# Patient Record
Sex: MALE | Race: WHITE | NOT HISPANIC OR LATINO | Employment: FULL TIME
[De-identification: names, ages, dates, MRNs, and addresses within clinical notes are randomized per-mention and may not be internally consistent; named-entity substitution may affect disease eponyms.]

---

## 2024-11-11 ENCOUNTER — OFFICE VISIT (OUTPATIENT)
Dept: INTERNAL MEDICINE | Facility: OTHER | Age: 44
End: 2024-11-11
Payer: COMMERCIAL

## 2024-11-11 VITALS
TEMPERATURE: 98.7 F | HEIGHT: 67 IN | BODY MASS INDEX: 29.51 KG/M2 | OXYGEN SATURATION: 95 % | WEIGHT: 188 LBS | DIASTOLIC BLOOD PRESSURE: 84 MMHG | HEART RATE: 106 BPM | SYSTOLIC BLOOD PRESSURE: 133 MMHG

## 2024-11-11 DIAGNOSIS — Z00.00 HEALTHCARE MAINTENANCE: ICD-10-CM

## 2024-11-11 DIAGNOSIS — R13.19 ESOPHAGEAL DYSPHAGIA: ICD-10-CM

## 2024-11-11 DIAGNOSIS — R53.83 OTHER FATIGUE: ICD-10-CM

## 2024-11-11 DIAGNOSIS — F41.1 GENERALIZED ANXIETY DISORDER: ICD-10-CM

## 2024-11-11 DIAGNOSIS — Z76.89 ENCOUNTER TO ESTABLISH CARE: ICD-10-CM

## 2024-11-11 DIAGNOSIS — Z23 NEED FOR VACCINATION: ICD-10-CM

## 2024-11-11 DIAGNOSIS — K21.9 GASTROESOPHAGEAL REFLUX DISEASE WITHOUT ESOPHAGITIS: ICD-10-CM

## 2024-11-11 DIAGNOSIS — F55.0: ICD-10-CM

## 2024-11-11 DIAGNOSIS — N52.8 OTHER MALE ERECTILE DYSFUNCTION: ICD-10-CM

## 2024-11-11 DIAGNOSIS — R68.82 LIBIDO, DECREASED: ICD-10-CM

## 2024-11-11 PROCEDURE — 3079F DIAST BP 80-89 MM HG: CPT | Mod: GC

## 2024-11-11 PROCEDURE — 90656 IIV3 VACC NO PRSV 0.5 ML IM: CPT

## 2024-11-11 PROCEDURE — 90471 IMMUNIZATION ADMIN: CPT

## 2024-11-11 PROCEDURE — 99204 OFFICE O/P NEW MOD 45 MIN: CPT | Mod: GC,25

## 2024-11-11 PROCEDURE — 3075F SYST BP GE 130 - 139MM HG: CPT | Mod: GC

## 2024-11-11 RX ORDER — OMEPRAZOLE 40 MG/1
40 CAPSULE, DELAYED RELEASE ORAL DAILY
Qty: 45 CAPSULE | Refills: 0 | Status: SHIPPED | OUTPATIENT
Start: 2024-11-11

## 2024-11-11 RX ORDER — FLUOXETINE 40 MG/1
40 CAPSULE ORAL DAILY
COMMUNITY

## 2024-11-11 RX ORDER — FAMOTIDINE 20 MG/1
20 TABLET, FILM COATED ORAL 2 TIMES DAILY PRN
Qty: 60 TABLET | Refills: 0 | Status: SHIPPED | OUTPATIENT
Start: 2024-11-11

## 2024-11-11 ASSESSMENT — ENCOUNTER SYMPTOMS
HEARTBURN: 1
WEIGHT LOSS: 0
ORTHOPNEA: 0
SHORTNESS OF BREATH: 0
COUGH: 0
CLAUDICATION: 0
DIARRHEA: 0
CONSTIPATION: 0
NAUSEA: 0
FEVER: 0
VOMITING: 0
BLOOD IN STOOL: 0
ABDOMINAL PAIN: 0
CHILLS: 0
WHEEZING: 0

## 2024-11-11 ASSESSMENT — PATIENT HEALTH QUESTIONNAIRE - PHQ9: CLINICAL INTERPRETATION OF PHQ2 SCORE: 0

## 2024-11-11 NOTE — PROGRESS NOTES
Teaching Physician Attestation      Level of Participation    I have personally interviewed and examined the patient.  In addition, I discussed with the resident physician the patient's history, exam, assessment and plan in detail.  Topics listed in my addendum were the focus of the visit.  Healthcare maintenance was not addressed this visit unless listed as a topic in my addendum.  I agree with the plan as written along with the following additions/modifications:    New Patient    PMH: DANE and depression per report, gerd, bmi 29 w prediabetes, former tobacco (quit 2008), vaping, social alcohol, prior methamphetamine use, shellfish allergy (hives)      Gerd  Present for over 12 years, worsening, 10 tums at night, better during day, tomato worseno vomiting, no stool changes, occasional dysphagia, no choking, significantly worse when laying down.   Pain is not exertional. Benign abdominal exam.    Plan  -Stop Tums.  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  No pain or burning or discharge with urination.  His left testicle is more sensitive than the right, he does not note a clear inciting event.  On exam his testicles are without gross trauma, he has no overt masses on palpation, his testicles are approximately 2 to 3 cm bilaterally, there is not dramatic atrophy.  Clearly states that he began SSRI/Prozac after the onset of symptoms    Plan  -Check TSH, testosterone, metabolic screens   -Follow-up next visit pending test results.   If all negative, could review anxiety, patient does report increased anxiety lately    Repeat hr normalized per report on ausculatation    BMI 29  -Check metabolic screens    Reported history of depression and anxiety  -Not fully addressed today.  Reports stable symptoms, no SI/HI.  Was previously followed by psychiatry in [place], he would like to establish with a psychiatrist here.      Plan  -Referral for psychiatry placed, appreciate support  -Continue Prozac at current dose for now  -Reviewed that there is an on-call physician available 24/7 at this clinic if any issues occur while awaiting establishment with psychiatry.    Return to clinic in approximately 1 month, in addition to above needs f/u on other parts of pmh not yet addressed (such as shellfish allergy report).  PCR

## 2024-11-11 NOTE — PROGRESS NOTES
New Patient    Kali Ocasio is a 44 y.o. male who presents today with the following:    CC: Establish Care with Primary Care Physician and discuss GERD symptoms    HPI:  Kali is a 45 y/o male with a PMHx of anxiety, depression, & GERD who presents to establish care and discuss severe heart burn symptoms    The patient reports a long-standing history of heartburn, which has become significantly worse over the past 4-5 months. The heartburn is particularly severe at night, often requiring the patient to take up to 12 Tums on a bad night. He experiences heartburn less frequently during the day, with mild symptoms once in a while. Triggers include red sauce, such as those found in pizza and spaghetti,  as well as soda which cause almost immediate symptoms. He reports occasional nausea and rarely vomits from the severity of the heartburn. There are no reports of bleeding, changes in stool color, or blood/mucus in stool. He has not been prescribed any acid-suppressing medication previously The patient reports no weight loss, no issues with swallowing, and no choking episodes. Occasional difficulty swallowing occurs where it feels like it's getting stuck lower down in his throat but not consistently.    The patient also reports a history of being diagnosed as prediabetic in February, with no other significant past medical diagnoses noted. He has not experienced any recent weight changes but is concerned about low testosterone levels. He reports a decrease in libido and erectile dysfunction over the past four years, despite improvement in personal relationships. He experiences fatigue and has reduced muscle strength relative to previous, was previously much more motivated and able to workout. These symptoms predate the initiation of Prozac     Past Medical History:   Diagnosis Date    Anxiety     Depression        History reviewed. No pertinent surgical history.    Family History   Problem Relation Age of Onset     Hypertension Mother        Social History     Socioeconomic History    Marital status: Not on file     Spouse name: Not on file    Number of children: Not on file    Years of education: Not on file    Highest education level: Not on file   Occupational History    Occupation:  for [employer]   Tobacco Use    Smoking status: Former     Types: Cigarettes     Start date: 2008     Quit date: 2001     Years since quittin.8    Smokeless tobacco: Never   Vaping Use    Vaping status: Some Days    Substances: Nicotine   Substance and Sexual Activity    Alcohol use: Yes     Comment: occasionally    Drug use: Not Currently     Types: Methamphetamines     Comment: previous meth use     Sexual activity: Yes   Other Topics Concern    Not on file   Social History Narrative    Not on file     Social Drivers of Health     Financial Resource Strain: Not on file   Food Insecurity: Not on file   Transportation Needs: Not on file   Physical Activity: Not on file   Stress: Not on file   Social Connections: Not on file   Intimate Partner Violence: Not on file   Housing Stability: Not on file       Current Outpatient Medications   Medication Sig Dispense Refill    fluoxetine (PROZAC) 40 MG capsule Take 40 mg by mouth every day.       No current facility-administered medications for this visit.       Allergies   Allergen Reactions    Shellfish-Derived Products Anaphylaxis and Swelling       ROS:   As per HPI. Additional pertinent systems as noted below.  Review of Systems   Constitutional:  Positive for malaise/fatigue. Negative for chills, fever and weight loss.   Respiratory:  Negative for cough, shortness of breath and wheezing.    Cardiovascular:  Negative for chest pain, orthopnea, claudication and leg swelling.   Gastrointestinal:  Positive for heartburn. Negative for abdominal pain, blood in stool, constipation, diarrhea, melena, nausea and vomiting.   Genitourinary:  Negative for dysuria, frequency,  "hematuria and urgency.   Skin:  Negative for itching and rash.       Physical Exam:  /84 (BP Location: Left arm, Patient Position: Sitting, BP Cuff Size: Adult)   Pulse (!) 106   Temp 37.1 °C (98.7 °F) (Temporal)   Ht 1.702 m (5' 7\")   Wt 85.3 kg (188 lb)   SpO2 95%   BMI 29.44 kg/m²     Physical Exam  Constitutional:       General: He is not in acute distress.     Appearance: Normal appearance.   HENT:      Head: Normocephalic and atraumatic.   Cardiovascular:      Rate and Rhythm: Normal rate and regular rhythm.      Pulses: Normal pulses.      Heart sounds: Normal heart sounds. No murmur heard.  Pulmonary:      Effort: Pulmonary effort is normal.      Breath sounds: Normal breath sounds. No wheezing, rhonchi or rales.   Abdominal:      General: Abdomen is flat. There is no distension.      Palpations: Abdomen is soft. There is no mass.      Tenderness: There is no abdominal tenderness.   Musculoskeletal:      Right lower leg: No edema.      Left lower leg: No edema.   Skin:     General: Skin is warm and dry.      Findings: No lesion or rash.   Neurological:      Mental Status: He is alert.          Assessment and Plan:     #Gastroesophageal reflux disease without esophagitis  #Esophageal dysphagia  GERD symptoms are quite sever, previous EGD 12 years ago unremarkable but recently symptoms have been worsening especially at night despite patient avoiding triggers as much as possible. Some alarm symptoms given patient reporting dysphagia like symptoms.  -Referral to Gastroenterology  -Omeprazole 40 mg   -Famotidine 20 mg BID PRN   -Lifestyle modifications  -f/u to discuss improvement in 5-6 weeks    #BMI 29.0-29.9,adult  - HEMOGLOBIN A1C; Future  - Lipid Profile; Future    #Generalized anxiety disorder  Would like reestablish with psychiatry since he moved recently.  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    Signed by: Ok Silva M.D.      Ok Silva, PGY-1  Internal Medicine  [facility]

## 2024-11-11 NOTE — PATIENT INSTRUCTIONS
-Please call the office if not contacted by GI or Psychiatry within 2 weeks.   -Please elevate the head of your bed and avoid meals shortly before bedtime   -Please take Famotidine as needed for reflux and take Omeprazole daily  -Please get labs at 8 am.

## 2024-11-14 ENCOUNTER — HOSPITAL ENCOUNTER (OUTPATIENT)
Dept: LAB | Facility: MEDICAL CENTER | Age: 44
End: 2024-11-14
Payer: COMMERCIAL

## 2024-11-14 DIAGNOSIS — R68.82 LIBIDO, DECREASED: ICD-10-CM

## 2024-11-14 DIAGNOSIS — R53.83 OTHER FATIGUE: ICD-10-CM

## 2024-11-14 DIAGNOSIS — F55.0: ICD-10-CM

## 2024-11-14 DIAGNOSIS — N52.8 OTHER MALE ERECTILE DYSFUNCTION: ICD-10-CM

## 2024-11-14 DIAGNOSIS — Z00.00 HEALTHCARE MAINTENANCE: ICD-10-CM

## 2024-11-14 LAB
ALBUMIN SERPL BCP-MCNC: 4.9 G/DL (ref 3.2–4.9)
ALBUMIN/GLOB SERPL: 1.8 G/DL
ALP SERPL-CCNC: 57 U/L (ref 30–99)
ALT SERPL-CCNC: 16 U/L (ref 2–50)
ANION GAP SERPL CALC-SCNC: 10 MMOL/L (ref 7–16)
AST SERPL-CCNC: 12 U/L (ref 12–45)
BILIRUB SERPL-MCNC: 0.6 MG/DL (ref 0.1–1.5)
BUN SERPL-MCNC: 15 MG/DL (ref 8–22)
CALCIUM ALBUM COR SERPL-MCNC: 8.6 MG/DL (ref 8.5–10.5)
CALCIUM SERPL-MCNC: 9.3 MG/DL (ref 8.5–10.5)
CHLORIDE SERPL-SCNC: 103 MMOL/L (ref 96–112)
CHOLEST SERPL-MCNC: 257 MG/DL (ref 100–199)
CO2 SERPL-SCNC: 24 MMOL/L (ref 20–33)
CREAT SERPL-MCNC: 0.91 MG/DL (ref 0.5–1.4)
EST. AVERAGE GLUCOSE BLD GHB EST-MCNC: 160 MG/DL
FASTING STATUS PATIENT QL REPORTED: NORMAL
GFR SERPLBLD CREATININE-BSD FMLA CKD-EPI: 108 ML/MIN/1.73 M 2
GLOBULIN SER CALC-MCNC: 2.7 G/DL (ref 1.9–3.5)
GLUCOSE SERPL-MCNC: 105 MG/DL (ref 65–99)
HBA1C MFR BLD: 7.2 % (ref 4–5.6)
HCV AB SER QL: NORMAL
HDLC SERPL-MCNC: 37 MG/DL
HIV 1+2 AB+HIV1 P24 AG SERPL QL IA: NORMAL
LDLC SERPL CALC-MCNC: 183 MG/DL
POTASSIUM SERPL-SCNC: 4.8 MMOL/L (ref 3.6–5.5)
PROT SERPL-MCNC: 7.6 G/DL (ref 6–8.2)
SODIUM SERPL-SCNC: 137 MMOL/L (ref 135–145)
TESTOST SERPL-MCNC: 323 NG/DL (ref 175–781)
TRIGL SERPL-MCNC: 187 MG/DL (ref 0–149)
TSH SERPL DL<=0.005 MIU/L-ACNC: 1.31 UIU/ML (ref 0.38–5.33)

## 2024-11-14 PROCEDURE — 83036 HEMOGLOBIN GLYCOSYLATED A1C: CPT

## 2024-11-14 PROCEDURE — 80053 COMPREHEN METABOLIC PANEL: CPT

## 2024-11-14 PROCEDURE — 84443 ASSAY THYROID STIM HORMONE: CPT

## 2024-11-14 PROCEDURE — 86803 HEPATITIS C AB TEST: CPT

## 2024-11-14 PROCEDURE — 80061 LIPID PANEL: CPT

## 2024-11-14 PROCEDURE — 36415 COLL VENOUS BLD VENIPUNCTURE: CPT

## 2024-11-14 PROCEDURE — 84403 ASSAY OF TOTAL TESTOSTERONE: CPT

## 2024-11-14 PROCEDURE — 87389 HIV-1 AG W/HIV-1&-2 AB AG IA: CPT

## 2024-12-23 ENCOUNTER — OFFICE VISIT (OUTPATIENT)
Dept: INTERNAL MEDICINE | Facility: OTHER | Age: 44
End: 2024-12-23
Payer: COMMERCIAL

## 2024-12-23 VITALS
HEART RATE: 77 BPM | BODY MASS INDEX: 27.62 KG/M2 | DIASTOLIC BLOOD PRESSURE: 85 MMHG | SYSTOLIC BLOOD PRESSURE: 124 MMHG | OXYGEN SATURATION: 97 % | HEIGHT: 67 IN | WEIGHT: 176 LBS | TEMPERATURE: 97.6 F

## 2024-12-23 DIAGNOSIS — K21.9 GASTROESOPHAGEAL REFLUX DISEASE WITHOUT ESOPHAGITIS: ICD-10-CM

## 2024-12-23 DIAGNOSIS — E78.5 HYPERLIPIDEMIA, UNSPECIFIED HYPERLIPIDEMIA TYPE: ICD-10-CM

## 2024-12-23 DIAGNOSIS — L60.0 INGROWN RIGHT BIG TOENAIL: ICD-10-CM

## 2024-12-23 DIAGNOSIS — B35.3 TINEA PEDIS OF BOTH FEET: ICD-10-CM

## 2024-12-23 DIAGNOSIS — F41.1 GENERALIZED ANXIETY DISORDER: ICD-10-CM

## 2024-12-23 DIAGNOSIS — E11.9 TYPE 2 DIABETES MELLITUS WITHOUT COMPLICATION, WITHOUT LONG-TERM CURRENT USE OF INSULIN (HCC): ICD-10-CM

## 2024-12-23 PROCEDURE — 3079F DIAST BP 80-89 MM HG: CPT | Mod: GC

## 2024-12-23 PROCEDURE — 99214 OFFICE O/P EST MOD 30 MIN: CPT | Mod: GC

## 2024-12-23 PROCEDURE — 3074F SYST BP LT 130 MM HG: CPT | Mod: GC

## 2024-12-23 RX ORDER — ATORVASTATIN CALCIUM 80 MG/1
80 TABLET, FILM COATED ORAL NIGHTLY
Qty: 60 TABLET | Refills: 1 | Status: SHIPPED | OUTPATIENT
Start: 2024-12-23

## 2024-12-23 RX ORDER — CLOTRIMAZOLE 1 %
1 CREAM (GRAM) TOPICAL 2 TIMES DAILY
Qty: 60 G | Refills: 0 | Status: SHIPPED | OUTPATIENT
Start: 2024-12-23

## 2024-12-23 ASSESSMENT — ENCOUNTER SYMPTOMS
ORTHOPNEA: 0
ABDOMINAL PAIN: 0
SHORTNESS OF BREATH: 0
VOMITING: 0
COUGH: 0
DIARRHEA: 0
CHILLS: 0
FEVER: 0
BLOOD IN STOOL: 0
NAUSEA: 0
CONSTIPATION: 1
WHEEZING: 0
HEARTBURN: 0
CLAUDICATION: 0
WEIGHT LOSS: 0

## 2024-12-23 NOTE — PROGRESS NOTES
Established Patient    Patient Care Team:  Ok Silva M.D. as PCP - General (Internal Medicine)    HPI:  Kali Ocasio is a 44 y.o. male with relevant past medical history of anxiety, depression, GERD who presents today to discuss GERD as well as his new diagnosis of T2DM and Hyperlipidemia.    The patient has been experiencing severe heartburn, particularly at night, which previously required frequent use of Tums. They also reported occasional difficulty swallowing. The patient was started on Omeprazole, which they have been taking daily without problems. They have not required additional medications except once after consuming a tomato-based sauce. The patient has successfully managed symptoms and reports improved sleep. Review of systems is negative for jaw pain or abnormal bony pains.    The patient has been managing type 2 diabetes with lifestyle changes including adopting a keto diet and regular exercise. They have significantly reduced carbohydrate and sugar intake and have lost 12 pounds over the last month. Denies any hyperglycemic symptoms. The patient's A1C is being monitored, and current management avoids the use of medication. They report being able to sustain the diet without missing sugar, though bread and pasta are challenging. The patient has been referred to GI for further evaluation of swallowing issues but does not feel he needs this any longer considering he has improved markedly.  Patient states he frequently checks his feet due to itching and ingrown toenails.    Regarding mental health, the patient has a history of anxiety and was previously on Prozac 40 mg but has not taken it for at least a month. They report managing well without it and experience normal work-related stress. They express interest in cognitive behavioral therapy in the future possibly and have a psychologist for their children.    The patient had a cholesterol panel about a month ago, which was quite elevated,  raising some concern for familial hypercholesteremia.  Especially given that he has multiple family members that are on statins. All other lab results, including electrolytes, kidney function, liver function, thyroid, and testosterone, were normal.    ASCVD  6.2%  Risk of cardiovascular event (coronary or stroke death or non-fatal MI or stroke) in next 10 years.      Review of Systems   Constitutional:  Negative for chills, fever, malaise/fatigue and weight loss.   Respiratory:  Negative for cough, shortness of breath and wheezing.    Cardiovascular:  Negative for chest pain, orthopnea, claudication and leg swelling.   Gastrointestinal:  Positive for constipation. Negative for abdominal pain, blood in stool, diarrhea, heartburn, melena, nausea and vomiting.   Genitourinary:  Negative for dysuria, frequency, hematuria and urgency.   Skin:  Negative for itching and rash.   :    Past Medical History:   Diagnosis Date    Anxiety     Depression        Social History     Tobacco Use    Smoking status: Former     Types: Cigarettes     Start date: 2008     Quit date: 2001     Years since quittin.9    Smokeless tobacco: Never   Vaping Use    Vaping status: Some Days    Substances: Nicotine   Substance Use Topics    Alcohol use: Yes     Comment: occasionally    Drug use: Not Currently     Types: Methamphetamines     Comment: previous meth use 2003       Current Outpatient Medications   Medication Sig Dispense Refill    fluoxetine (PROZAC) 40 MG capsule Take 40 mg by mouth every day.      omeprazole (PRILOSEC) 40 MG delayed-release capsule Take 1 Capsule by mouth every day. 45 Capsule 0    famotidine (PEPCID) 20 MG Tab Take 1 Tablet by mouth 2 times a day as needed (For Acute Heartburn). 60 Tablet 0     No current facility-administered medications for this visit.       /85 (BP Location: Left arm, Patient Position: Sitting, BP Cuff Size: Adult)   Pulse 77   Temp 36.4 °C (97.6 °F) (Temporal)   Ht 1.702 m (5'  "7\")   Wt 79.8 kg (176 lb)   SpO2 97%   BMI 27.57 kg/m²   Physical Exam  Constitutional:       General: He is not in acute distress.     Appearance: Normal appearance.   HENT:      Head: Normocephalic and atraumatic.      Mouth/Throat:      Mouth: Mucous membranes are moist.      Pharynx: Oropharynx is clear. No oropharyngeal exudate or posterior oropharyngeal erythema.   Cardiovascular:      Rate and Rhythm: Normal rate and regular rhythm.      Pulses: Normal pulses.      Heart sounds: Normal heart sounds. No murmur heard.  Pulmonary:      Effort: Pulmonary effort is normal.      Breath sounds: Normal breath sounds. No wheezing, rhonchi or rales.   Abdominal:      General: Abdomen is flat. There is no distension.      Palpations: Abdomen is soft. There is no mass.      Tenderness: There is no abdominal tenderness.   Musculoskeletal:      Right lower leg: No edema.      Left lower leg: No edema.   Feet:      Comments: [place] test negative bilaterally. Tinea pedis bilaterally particularly in the spaces between toes  Skin:     General: Skin is warm and dry.      Findings: No lesion or rash.   Neurological:      Mental Status: He is alert.           Assessment and Plan:     1. Type 2 diabetes mellitus without complication, without long-term current use of insulin (HCC)  This is a new diagnosis of type 2 diabetes with his A1c of 7.2 patient denies any previous hyperglycemic symptoms.  Patient frequently checks his feet and has not noticed any wounds test was also negative.  Had a lengthy discussion about how he is making significant lifestyle modifications and given this we will hold off on prescribing meds until we see if this can markedly improve his A1c  - atorvastatin (LIPITOR) 80 MG tablet; Take 1 Tablet by mouth every evening.  Dispense: 60 Tablet; Refill: 1  - Microalbumin Creat Ratio Urine (Lab Collect); Future  - HEMOGLOBIN A1C; Future  -Target A1c of 7 or lower  -Recommended eye and dental exams " yearly  -Will discuss pneumococcal vaccination at following visit  -Encouraged patient on his weight loss as well as lifestyle modifications    2. Gastroesophageal reflux disease without esophagitis  Patient's lifestyle modifications as well as the omeprazole are really helping the patient he feels that it is very under control and he is avoiding trigger foods is much as possible.  His sleep is markedly improved as well as a result of this  -Decrease omeprazole to 20 with instructions to increase if not effective  -Continue lifestyle modifications   -Encouraged weight loss      4. Generalized anxiety disorder  States this is well-managed without medications at this time says he can pursue therapy in the future but not right in this moment  -Continue lifestyle modifications and stress management strategies    5. Hyperlipidemia, unspecified hyperlipidemia type  Patient has very elevated LDL, close to criteria for familial.  Given patient's body habitus unlikely to be this high without some genetic component contributing.  States many of his family members are also on statins.  Would be reasonable to start high intensity statin patient was amenable after risk-benefit discussion.  - atorvastatin (LIPITOR) 80 MG tablet; Take 1 Tablet by mouth every evening.  Dispense: 60 Tablet; Refill: 1  - Lipid Profile; Future    6. Tinea pedis of both feet  -Clotrimazole ointment  - Referral to Podiatry    7. Ingrown right big toenail  - Referral to Podiatry       This note was created using voice recognition software.  While every attempt is made to ensure accuracy of transcription, occasionally errors occur.    Ok Silva MD  Internal Medicine PGY-1

## 2024-12-23 NOTE — PROGRESS NOTES
Teaching Physician Attestation      Level of Participation    I have personally interviewed and examined the patient.  In addition, I discussed with the resident physician the patient's history, exam, assessment and plan in detail.  Topics listed in my addendum were the focus of the visit.  Healthcare maintenance was not addressed this visit unless listed as a topic in my addendum.  I agree with the plan as written along with the following additions/modifications:    New onset DM2, mild htg  -New diagnosis, patient has already lost approximately 12 pounds with significant diet changes, praised patient for his excellent work, as close to his target A1c of 7.0 with excellent dietary changes, will continue to monitor.  Repeat A1c before next follow-up  -Begin atorvastatin 80 mg once daily as patient is borderline for familial hyperlipidemia syndrome.  Counseled extensively on risks related to not treating.  -Check urine microalbumin to creatinine ratio  -Recommend eye and dental exams  -No wounds on feet, Alpha touch test negative bilaterally.  Tinea pedis bilaterally worse between fourth and fifth digits, mild maceration between left fourth and fifth digits, will start clotrimazole twice daily for 1 month  -Needs pneumococcal vaccination today, need to discuss covid      GERD  -Completely resolved on omeprazole 40 mg.  Will trial reduction to 20 mg once daily.      Mood stable reported off of Prozac, self weaned off of Prozac, asked to please contact that was to change    Return to clinic in 2 months.  PCR.

## 2025-03-18 ENCOUNTER — HOSPITAL ENCOUNTER (OUTPATIENT)
Dept: LAB | Facility: MEDICAL CENTER | Age: 45
End: 2025-03-18
Payer: COMMERCIAL

## 2025-03-18 DIAGNOSIS — E78.5 HYPERLIPIDEMIA, UNSPECIFIED HYPERLIPIDEMIA TYPE: ICD-10-CM

## 2025-03-18 DIAGNOSIS — E11.9 TYPE 2 DIABETES MELLITUS WITHOUT COMPLICATION, WITHOUT LONG-TERM CURRENT USE OF INSULIN (HCC): ICD-10-CM

## 2025-03-18 LAB
CHOLEST SERPL-MCNC: 168 MG/DL (ref 100–199)
CREAT UR-MCNC: 103 MG/DL
EST. AVERAGE GLUCOSE BLD GHB EST-MCNC: 117 MG/DL
FASTING STATUS PATIENT QL REPORTED: NORMAL
HBA1C MFR BLD: 5.7 % (ref 4–5.6)
HDLC SERPL-MCNC: 45 MG/DL
LDLC SERPL CALC-MCNC: 105 MG/DL
MICROALBUMIN UR-MCNC: <1.2 MG/DL
MICROALBUMIN/CREAT UR: NORMAL MG/G (ref 0–30)
TRIGL SERPL-MCNC: 89 MG/DL (ref 0–149)

## 2025-03-18 PROCEDURE — 83036 HEMOGLOBIN GLYCOSYLATED A1C: CPT

## 2025-03-18 PROCEDURE — 80061 LIPID PANEL: CPT

## 2025-03-18 PROCEDURE — 82570 ASSAY OF URINE CREATININE: CPT

## 2025-03-18 PROCEDURE — 82043 UR ALBUMIN QUANTITATIVE: CPT

## 2025-03-18 PROCEDURE — 36415 COLL VENOUS BLD VENIPUNCTURE: CPT

## 2025-03-19 ENCOUNTER — OFFICE VISIT (OUTPATIENT)
Dept: INTERNAL MEDICINE | Facility: OTHER | Age: 45
End: 2025-03-19
Payer: COMMERCIAL

## 2025-03-19 VITALS
SYSTOLIC BLOOD PRESSURE: 109 MMHG | OXYGEN SATURATION: 97 % | BODY MASS INDEX: 28.44 KG/M2 | TEMPERATURE: 98.5 F | WEIGHT: 181.2 LBS | HEART RATE: 91 BPM | HEIGHT: 67 IN | DIASTOLIC BLOOD PRESSURE: 72 MMHG

## 2025-03-19 DIAGNOSIS — E11.9 DIET-CONTROLLED DIABETES MELLITUS (HCC): ICD-10-CM

## 2025-03-19 DIAGNOSIS — E11.9 TYPE 2 DIABETES MELLITUS WITHOUT COMPLICATION, WITHOUT LONG-TERM CURRENT USE OF INSULIN (HCC): ICD-10-CM

## 2025-03-19 DIAGNOSIS — T46.6X5A STATIN MYOPATHY: ICD-10-CM

## 2025-03-19 DIAGNOSIS — R68.82 LIBIDO, DECREASED: ICD-10-CM

## 2025-03-19 DIAGNOSIS — N52.8 OTHER MALE ERECTILE DYSFUNCTION: ICD-10-CM

## 2025-03-19 DIAGNOSIS — E78.5 HYPERLIPIDEMIA, UNSPECIFIED HYPERLIPIDEMIA TYPE: ICD-10-CM

## 2025-03-19 DIAGNOSIS — R53.83 OTHER FATIGUE: ICD-10-CM

## 2025-03-19 DIAGNOSIS — F41.1 GENERALIZED ANXIETY DISORDER: ICD-10-CM

## 2025-03-19 DIAGNOSIS — G72.0 STATIN MYOPATHY: ICD-10-CM

## 2025-03-19 DIAGNOSIS — G47.30 SLEEP APNEA, UNSPECIFIED TYPE: ICD-10-CM

## 2025-03-19 PROCEDURE — 99214 OFFICE O/P EST MOD 30 MIN: CPT | Mod: GC

## 2025-03-19 PROCEDURE — 3078F DIAST BP <80 MM HG: CPT | Mod: GC

## 2025-03-19 PROCEDURE — 3074F SYST BP LT 130 MM HG: CPT | Mod: GC

## 2025-03-19 RX ORDER — ATORVASTATIN CALCIUM 40 MG/1
40 TABLET, FILM COATED ORAL NIGHTLY
Qty: 90 TABLET | Refills: 1 | Status: SHIPPED | OUTPATIENT
Start: 2025-03-19

## 2025-03-19 ASSESSMENT — ENCOUNTER SYMPTOMS
EYES NEGATIVE: 1
ORTHOPNEA: 0
BACK PAIN: 0
HALLUCINATIONS: 0
NERVOUS/ANXIOUS: 0
VOMITING: 0
MYALGIAS: 1
DIARRHEA: 0
DIZZINESS: 0
CONSTIPATION: 0
HEMOPTYSIS: 0
POLYDIPSIA: 0
BRUISES/BLEEDS EASILY: 0
SPUTUM PRODUCTION: 0
NECK PAIN: 0
SPEECH CHANGE: 0
TREMORS: 0
DEPRESSION: 0
SHORTNESS OF BREATH: 0
COUGH: 0
SENSORY CHANGE: 0
NAUSEA: 0
TINGLING: 0
FEVER: 0
ABDOMINAL PAIN: 0
CLAUDICATION: 0
WEIGHT LOSS: 0
PALPITATIONS: 0
CHILLS: 0
HEADACHES: 0

## 2025-03-19 ASSESSMENT — PATIENT HEALTH QUESTIONNAIRE - PHQ9: CLINICAL INTERPRETATION OF PHQ2 SCORE: 0

## 2025-03-19 ASSESSMENT — LIFESTYLE VARIABLES: SUBSTANCE_ABUSE: 0

## 2025-03-20 NOTE — PROGRESS NOTES
Follow Up      Chief Complaint: Fatigue     Last Seen: 12/23/24    History of Present Illness:   Kali Ocasio is a 44 y.o. male with PMH of relevant past medical history of anxiety, depression, GERD, diet-controlled T2DM, and hyperlipidemia who presents today to discuss fatigue.     The patient reports persistent fatigue over the past few months despite improvements in diabetes management, which is now diet-controlled with an A1c of 5.7. The patient has lost approximately 16 pounds since the initial visit in December, going from 190 pounds to around 174 pounds now back up to 181, but notes a plateau in weight loss but does note some weight gain. The patient continues to exercise regularly and maintains the same diet but reports no change in energy levels. The patient denies any recent gastrointestinal symptoms such as diarrhea or stomach upset, and there are no swallowing difficulties currently. Stress levels are reportedly high due to marital and work-related issues, with frequent travel adding to the fatigue. The patient is not experiencing symptoms of depression but feels worn out.  Notes that he is still experiencing decreased libido as well as increased erectile dysfunction.    - Positive: fatigue, muscle soreness potentially related to Lipitor.  - Negative: diarrhea, stomach upset, swallowing difficulties, depression symptoms, unintentional weight loss.    Review of Systems:  Review of Systems   Constitutional:  Positive for malaise/fatigue. Negative for chills, fever and weight loss.   HENT: Negative.     Eyes: Negative.    Respiratory:  Negative for cough, hemoptysis, sputum production and shortness of breath.    Cardiovascular:  Negative for chest pain, palpitations, orthopnea, claudication and leg swelling.   Gastrointestinal:  Negative for abdominal pain, constipation, diarrhea, nausea and vomiting.   Genitourinary:  Negative for dysuria, frequency and urgency.   Musculoskeletal:  Positive  for myalgias. Negative for back pain, joint pain and neck pain.   Skin:  Negative for itching and rash.   Neurological:  Negative for dizziness, tingling, tremors, sensory change, speech change and headaches.   Endo/Heme/Allergies:  Negative for environmental allergies and polydipsia. Does not bruise/bleed easily.   Psychiatric/Behavioral:  Negative for depression, hallucinations, substance abuse and suicidal ideas. The patient is not nervous/anxious.    All other systems reviewed and are negative.       Past Medical History:   Past Medical History:   Diagnosis Date    Anxiety     Depression        Patient Active Problem List    Diagnosis Date Noted    Type 2 diabetes mellitus without complication, without long-term current use of insulin (HCC) 2024    Dyslipidemia 2024    BMI 29.0-29.9,adult 2024    Gastroesophageal reflux disease without esophagitis 2024    Other fatigue 2024    Generalized anxiety disorder 2024       Past Surgical History:   No past surgical history on file.     Allergies:  Iodine and Shellfish-derived products    Medications:     Current Outpatient Medications:     atorvastatin, 40 mg, Oral, Nightly, Taking    omeprazole, 40 mg, Oral, DAILY, Taking    fluoxetine, 40 mg, Oral, DAILY, PRN    famotidine, 20 mg, Oral, BID PRN, PRN     Social History:   Social History     Tobacco Use    Smoking status: Former     Types: Cigarettes     Start date: 2008     Quit date: 2001     Years since quittin.2    Smokeless tobacco: Never   Vaping Use    Vaping status: Some Days    Substances: Nicotine   Substance Use Topics    Alcohol use: Yes     Comment: occasionally    Drug use: Not Currently     Types: Methamphetamines     Comment: previous meth use        Family History:   Family History   Problem Relation Age of Onset    Hypertension Mother        Objective:  Vitals:   /72 (BP Location: Left arm, Patient Position: Sitting, BP Cuff Size: Adult)   Pulse  "91   Temp 36.9 °C (98.5 °F) (Temporal)   Ht 1.702 m (5' 7\")   Wt 82.2 kg (181 lb 3.2 oz)   SpO2 97%  Body mass index is 28.38 kg/m².    Physical Exam:   Physical Exam  Vitals reviewed.   Constitutional:       Appearance: Normal appearance.   HENT:      Head: Normocephalic and atraumatic.      Nose: Nose normal.      Mouth/Throat:      Mouth: Mucous membranes are moist.      Pharynx: Oropharynx is clear.   Eyes:      Extraocular Movements: Extraocular movements intact.      Conjunctiva/sclera: Conjunctivae normal.      Pupils: Pupils are equal, round, and reactive to light.   Cardiovascular:      Rate and Rhythm: Normal rate and regular rhythm.      Pulses: Normal pulses.      Heart sounds: Normal heart sounds. No murmur heard.  Pulmonary:      Effort: Pulmonary effort is normal.      Breath sounds: Normal breath sounds. No wheezing, rhonchi or rales.   Abdominal:      General: Abdomen is flat. Bowel sounds are normal. There is no distension.      Palpations: Abdomen is soft. There is no mass.      Tenderness: There is no abdominal tenderness.   Musculoskeletal:         General: Normal range of motion.      Right lower leg: No edema.      Left lower leg: No edema.   Skin:     General: Skin is warm and dry.      Capillary Refill: Capillary refill takes less than 2 seconds.      Findings: No lesion or rash.   Neurological:      General: No focal deficit present.      Mental Status: He is alert and oriented to person, place, and time. Mental status is at baseline.      Cranial Nerves: No cranial nerve deficit.      Motor: No weakness.          Results:  Labs and imaging relevant to this visit were reviewed.     Assessment and Plan:    44 y.o. male with:     1. Diet-controlled diabetes mellitus (HCC)  2. Type 2 diabetes mellitus without complication, without long-term current use of insulin (HCC)  Patient's most recent A1c is back within goal patient feels markedly better than previous in terms of exercise and weight " is pursuing a very strict diet in an effort to keep this controlled.  Most recent microalbumin creatinine ratio normal. Foot exam has been unremarkable patient is aware that he needs to check his feet routinely.  -Encouraged patient on excellent improvement in diabetic control with diet and exercise  - Diabetic Monofilament LE Exam  - Referral for Retinal Screening Exam; Future  - atorvastatin (LIPITOR) 40 MG Tab; Take 1 Tablet by mouth every evening.  Dispense: 90 Tablet; Refill: 1  - HEMOGLOBIN A1C; Future  - Lipid Profile; Future    3. Hyperlipidemia, unspecified hyperlipidemia type  9. Statin myopathy  Well controlled now but patient with some muscle pain particularly after his evening dose of atorvastatin  - atorvastatin (LIPITOR) 40 MG Tab; Take 1 Tablet by mouth every evening.  Dispense: 90 Tablet; Refill: 1  - Lipid Profile; Future  - CREATINE KINASE; Future    4. Other fatigue  8. Sleep apnea, unspecified type  Unclear etiology of fatigue at this time workup has been thus far negative does not have any specific symptoms that would correlate this with any specific pathology but does note that he has some issues getting well rested and with sleeping at times he has been noted to have snoring episodes in the past.   - VITAMIN D,25 HYDROXY (DEFICIENCY); Future  - CBC WITH DIFFERENTIAL; Future  - FERRITIN; Future  - IRON/TOTAL IRON BIND; Future  - Sed Rate; Future  - CRP QUANTITIVE (NON-CARDIAC); Future  - CREATINE KINASE; Future  - TESTOSTERONE SERUM; Future  - Overnight Home Sleep Study; Future    5. Libido, decreased  6. Other male erectile dysfunction  Patient with continued symptoms related to this is concerned that this is worsened as of late  - TESTOSTERONE SERUM; Future    7. Generalized anxiety disorder  His fatigue is likely contributory to this as well as his current psychosocial factors that have been affecting him.  - Feels that therapy would help him markedly because he enjoys talking to them but  at this time he says he will defer this for later  -I did provide patient with behavioral health phone number he does have an authorized referral already    No problem-specific Assessment & Plan notes found for this encounter.       Orders Placed This Encounter    Diabetic Monofilament LE Exam    Overnight Home Sleep Study    VITAMIN D,25 HYDROXY (DEFICIENCY)    CBC WITH DIFFERENTIAL    FERRITIN    IRON/TOTAL IRON BIND    Sed Rate    CRP QUANTITIVE (NON-CARDIAC)    CREATINE KINASE    TESTOSTERONE SERUM    HEMOGLOBIN A1C    Lipid Profile    Referral for Retinal Screening Exam    atorvastatin (LIPITOR) 40 MG Tab       Return in about 3 months (around 6/19/2025).    Ok Silva MD  Internal Medicine PGY-1  [facility] [employer]

## 2025-03-25 NOTE — Clinical Note
REFERRAL APPROVAL NOTICE         Sent on March 25, 2025                   Kali Ocasio  [address]  [address]  [place] [address]                   Dear Mr. Ocasio,    After a careful review of the medical information and benefit coverage, [facility] has processed your referral. See below for additional details.    If applicable, you must be actively enrolled with your insurance for coverage of the authorized service. If you have any questions regarding your coverage, please contact your insurance directly.    REFERRAL INFORMATION   Referral #:  [id number]  Referred-To Provider    Referred-By Provider:  Ophthalmology    Ok Silva M.D.   [facility] [place]      [address] [facility] [address]  [phone] [address]  [place] [address]  [phone]    Referral Start Date:  03/19/2025  Referral End Date:   03/19/2026             SCHEDULING  If you do not already have an appointment, please call [phone] to make an appointment.     MORE INFORMATION  If you do not already have a [employer] account, sign up at: [email or web address]  You can access your medical information, make appointments, see lab results, billing information, and more.  If you have questions regarding this referral, please contact  the [facility] Referrals department at:             [phone]. Monday - Friday 8:00AM - 5:00PM.     Sincerely,    [facility]

## 2025-04-07 NOTE — Clinical Note
REFERRAL APPROVAL NOTICE         Sent on April 7, 2025                   Kali Ocasio  [address]  [address]  [facility] [address]                   Dear Mr. Ocasio,    After a careful review of the medical information and benefit coverage, [facility] has processed your referral. See below for additional details.    If applicable, you must be actively enrolled with your insurance for coverage of the authorized service. If you have any questions regarding your coverage, please contact your insurance directly.    REFERRAL INFORMATION   Referral #:  [id number]  Referred-To Department    Referred-By Provider:  Pulmonary and Sleep Medicine    Ok Silva M.D.   [facility]      [address]  [facility] [address]  [phone] [address] [facility] [id number] [phone]    Referral Start Date:  03/19/2025  Referral End Date:   03/19/2026             SCHEDULING  If you do not already have an appointment, please call [phone] to make an appointment.     MORE INFORMATION  If you do not already have a [employer] account, sign up at: [email or web address]  You can access your medical information, make appointments, see lab results, billing information, and more.  If you have questions regarding this referral, please contact  the [facility] Referrals department at:             [phone]. Monday - Friday 8:00AM - 5:00PM.     Sincerely,    [facility]

## 2025-04-15 ENCOUNTER — HOSPITAL ENCOUNTER (OUTPATIENT)
Dept: LAB | Facility: MEDICAL CENTER | Age: 45
End: 2025-04-15
Payer: COMMERCIAL

## 2025-04-15 ENCOUNTER — HOSPITAL ENCOUNTER (OUTPATIENT)
Dept: LAB | Facility: MEDICAL CENTER | Age: 45
End: 2025-04-15
Attending: NURSE PRACTITIONER
Payer: COMMERCIAL

## 2025-04-15 ENCOUNTER — RESULTS FOLLOW-UP (OUTPATIENT)
Dept: NEUROLOGY | Facility: MEDICAL CENTER | Age: 45
End: 2025-04-15

## 2025-04-15 DIAGNOSIS — R68.82 LIBIDO, DECREASED: ICD-10-CM

## 2025-04-15 DIAGNOSIS — R53.83 OTHER FATIGUE: ICD-10-CM

## 2025-04-15 DIAGNOSIS — T46.6X5A STATIN MYOPATHY: ICD-10-CM

## 2025-04-15 DIAGNOSIS — G47.30 SLEEP APNEA, UNSPECIFIED TYPE: ICD-10-CM

## 2025-04-15 DIAGNOSIS — N52.8 OTHER MALE ERECTILE DYSFUNCTION: ICD-10-CM

## 2025-04-15 DIAGNOSIS — G72.0 STATIN MYOPATHY: ICD-10-CM

## 2025-04-15 LAB
25(OH)D3 SERPL-MCNC: 29 NG/ML (ref 30–100)
BASOPHILS # BLD AUTO: 0.4 % (ref 0–1.8)
BASOPHILS # BLD: 0.03 K/UL (ref 0–0.12)
CK SERPL-CCNC: 94 U/L (ref 0–154)
CRP SERPL HS-MCNC: <0.3 MG/DL (ref 0–0.75)
EOSINOPHIL # BLD AUTO: 0.29 K/UL (ref 0–0.51)
EOSINOPHIL NFR BLD: 3.6 % (ref 0–6.9)
ERYTHROCYTE [DISTWIDTH] IN BLOOD BY AUTOMATED COUNT: 42.5 FL (ref 35.9–50)
ERYTHROCYTE [SEDIMENTATION RATE] IN BLOOD BY WESTERGREN METHOD: 2 MM/HOUR (ref 0–20)
FERRITIN SERPL-MCNC: 391 NG/ML (ref 22–322)
HCT VFR BLD AUTO: 48.6 % (ref 42–52)
HGB BLD-MCNC: 16.7 G/DL (ref 14–18)
IMM GRANULOCYTES # BLD AUTO: 0.03 K/UL (ref 0–0.11)
IMM GRANULOCYTES NFR BLD AUTO: 0.4 % (ref 0–0.9)
IRON SATN MFR SERPL: 37 % (ref 15–55)
IRON SERPL-MCNC: 135 UG/DL (ref 50–180)
LYMPHOCYTES # BLD AUTO: 2.1 K/UL (ref 1–4.8)
LYMPHOCYTES NFR BLD: 25.8 % (ref 22–41)
MCH RBC QN AUTO: 30.2 PG (ref 27–33)
MCHC RBC AUTO-ENTMCNC: 34.4 G/DL (ref 32.3–36.5)
MCV RBC AUTO: 87.9 FL (ref 81.4–97.8)
MONOCYTES # BLD AUTO: 0.76 K/UL (ref 0–0.85)
MONOCYTES NFR BLD AUTO: 9.3 % (ref 0–13.4)
NEUTROPHILS # BLD AUTO: 4.94 K/UL (ref 1.82–7.42)
NEUTROPHILS NFR BLD: 60.5 % (ref 44–72)
NRBC # BLD AUTO: 0 K/UL
NRBC BLD-RTO: 0 /100 WBC (ref 0–0.2)
PLATELET # BLD AUTO: 317 K/UL (ref 164–446)
PMV BLD AUTO: 10.1 FL (ref 9–12.9)
PSA SERPL DL<=0.01 NG/ML-MCNC: 0.26 NG/ML (ref 0–4)
RBC # BLD AUTO: 5.53 M/UL (ref 4.7–6.1)
TESTOST SERPL-MCNC: 1150 NG/DL (ref 175–781)
TIBC SERPL-MCNC: 364 UG/DL (ref 250–450)
UIBC SERPL-MCNC: 229 UG/DL (ref 110–370)
WBC # BLD AUTO: 8.2 K/UL (ref 4.8–10.8)

## 2025-04-15 PROCEDURE — 84403 ASSAY OF TOTAL TESTOSTERONE: CPT

## 2025-04-15 PROCEDURE — 85025 COMPLETE CBC W/AUTO DIFF WBC: CPT

## 2025-04-15 PROCEDURE — 82306 VITAMIN D 25 HYDROXY: CPT

## 2025-04-15 PROCEDURE — 83540 ASSAY OF IRON: CPT

## 2025-04-15 PROCEDURE — 84153 ASSAY OF PSA TOTAL: CPT

## 2025-04-15 PROCEDURE — 84402 ASSAY OF FREE TESTOSTERONE: CPT

## 2025-04-15 PROCEDURE — 82550 ASSAY OF CK (CPK): CPT

## 2025-04-15 PROCEDURE — 85652 RBC SED RATE AUTOMATED: CPT

## 2025-04-15 PROCEDURE — 82728 ASSAY OF FERRITIN: CPT

## 2025-04-15 PROCEDURE — 83550 IRON BINDING TEST: CPT

## 2025-04-15 PROCEDURE — 86140 C-REACTIVE PROTEIN: CPT

## 2025-04-15 PROCEDURE — 84270 ASSAY OF SEX HORMONE GLOBUL: CPT

## 2025-04-15 PROCEDURE — 36415 COLL VENOUS BLD VENIPUNCTURE: CPT

## 2025-04-18 LAB
SHBG SERPL-SCNC: 26 NMOL/L (ref 17–56)
TESTOST FREE MFR SERPL: 2.5 % (ref 1.6–2.9)
TESTOST FREE SERPL-MCNC: 291 PG/ML (ref 47–244)
TESTOST SERPL-MCNC: 1187 NG/DL (ref 300–890)

## 2025-05-01 RX ORDER — OMEPRAZOLE 40 MG/1
40 CAPSULE, DELAYED RELEASE ORAL DAILY
Qty: 60 CAPSULE | Refills: 0 | Status: SHIPPED | OUTPATIENT
Start: 2025-05-01 | End: 2025-05-07

## 2025-05-02 ENCOUNTER — TELEPHONE (OUTPATIENT)
Dept: INTERNAL MEDICINE | Facility: OTHER | Age: 45
End: 2025-05-02
Payer: COMMERCIAL

## 2025-05-02 NOTE — TELEPHONE ENCOUNTER
DOCUMENTATION OF PAR STATUS:    1. Name of Medication & Dose: Omeprazole DR 40mg     2. Name of Prescription Coverage Company & phone #: [employer] Electronic Prior Authorization Form (2017 NCPDP)     3. Date Prior Auth Submitted: 5/2/202    4. What information was given to obtain insurance decision? DX and OV notes    5. Prior Auth Status? Pending    6. Patient Notified: N/A

## 2025-05-02 NOTE — TELEPHONE ENCOUNTER
FINAL PRIOR AUTHORIZATION STATUS:    1.  Name of Medication & Dose: Omeprazole     2. Prior Auth Status: Denied.  Reason: Medication not covered thru Plan. Denial letter has been scanned.     3. Action Taken: Pharmacy Notified: N\A Patient Notified: N\A      Prescription was sent for a 60 days supply. Most insurance's do not cover 60 days and only cover a 30 or 90 days supply. Please try sending it for a 30 day supply. Dispense quantity will have to be changed to 30.

## 2025-06-11 ENCOUNTER — OFFICE VISIT (OUTPATIENT)
Dept: INTERNAL MEDICINE | Facility: OTHER | Age: 45
End: 2025-06-11
Payer: COMMERCIAL

## 2025-06-11 VITALS
TEMPERATURE: 97.9 F | WEIGHT: 188.6 LBS | SYSTOLIC BLOOD PRESSURE: 121 MMHG | BODY MASS INDEX: 29.6 KG/M2 | OXYGEN SATURATION: 95 % | DIASTOLIC BLOOD PRESSURE: 84 MMHG | HEIGHT: 67 IN | HEART RATE: 93 BPM

## 2025-06-11 DIAGNOSIS — Z13.0 SCREENING FOR ENDOCRINE, NUTRITIONAL, METABOLIC AND IMMUNITY DISORDER: ICD-10-CM

## 2025-06-11 DIAGNOSIS — Z79.890 LONG-TERM CURRENT USE OF TESTOSTERONE CYPIONATE: ICD-10-CM

## 2025-06-11 DIAGNOSIS — Z13.228 SCREENING FOR ENDOCRINE, NUTRITIONAL, METABOLIC AND IMMUNITY DISORDER: ICD-10-CM

## 2025-06-11 DIAGNOSIS — K21.9 GASTROESOPHAGEAL REFLUX DISEASE WITHOUT ESOPHAGITIS: Primary | ICD-10-CM

## 2025-06-11 DIAGNOSIS — Z87.892 H/O ANAPHYLACTIC SHOCK: ICD-10-CM

## 2025-06-11 DIAGNOSIS — Z13.29 SCREENING FOR ENDOCRINE, NUTRITIONAL, METABOLIC AND IMMUNITY DISORDER: ICD-10-CM

## 2025-06-11 DIAGNOSIS — E78.5 HYPERLIPIDEMIA, UNSPECIFIED HYPERLIPIDEMIA TYPE: ICD-10-CM

## 2025-06-11 DIAGNOSIS — Z91.013 SHELLFISH ALLERGY: ICD-10-CM

## 2025-06-11 DIAGNOSIS — Z13.21 SCREENING FOR ENDOCRINE, NUTRITIONAL, METABOLIC AND IMMUNITY DISORDER: ICD-10-CM

## 2025-06-11 DIAGNOSIS — F41.8 SITUATIONAL ANXIETY: ICD-10-CM

## 2025-06-11 DIAGNOSIS — E11.9 DIET-CONTROLLED DIABETES MELLITUS (HCC): ICD-10-CM

## 2025-06-11 PROCEDURE — 3074F SYST BP LT 130 MM HG: CPT

## 2025-06-11 PROCEDURE — 3079F DIAST BP 80-89 MM HG: CPT

## 2025-06-11 PROCEDURE — 99213 OFFICE O/P EST LOW 20 MIN: CPT | Mod: GE

## 2025-06-11 RX ORDER — FAMOTIDINE 20 MG/1
20 TABLET, FILM COATED ORAL NIGHTLY PRN
COMMUNITY

## 2025-06-11 RX ORDER — TESTOSTERONE CYPIONATE 200 MG/ML
INJECTION, SOLUTION INTRAMUSCULAR
COMMUNITY
Start: 2025-05-16

## 2025-06-11 RX ORDER — EPINEPHRINE 0.3 MG/.3ML
INJECTION SUBCUTANEOUS
Qty: 1 EACH | Refills: 0 | Status: SHIPPED | OUTPATIENT
Start: 2025-06-11

## 2025-06-11 ASSESSMENT — ENCOUNTER SYMPTOMS
MYALGIAS: 0
SPUTUM PRODUCTION: 0
POLYDIPSIA: 0
WEIGHT LOSS: 0
HALLUCINATIONS: 0
SPEECH CHANGE: 0
ABDOMINAL PAIN: 0
SENSORY CHANGE: 0
NECK PAIN: 0
CHILLS: 0
NERVOUS/ANXIOUS: 0
SHORTNESS OF BREATH: 0
BACK PAIN: 0
EYES NEGATIVE: 1
ORTHOPNEA: 0
COUGH: 0
CLAUDICATION: 0
TREMORS: 0
DIARRHEA: 0
CONSTIPATION: 0
HEADACHES: 0
NAUSEA: 0
BRUISES/BLEEDS EASILY: 0
VOMITING: 0
DEPRESSION: 0
HEMOPTYSIS: 0
TINGLING: 0
FEVER: 0
PALPITATIONS: 0
DIZZINESS: 0

## 2025-06-11 ASSESSMENT — FIBROSIS 4 INDEX: FIB4 SCORE: 0.41

## 2025-06-11 ASSESSMENT — LIFESTYLE VARIABLES: SUBSTANCE_ABUSE: 0

## 2025-06-11 NOTE — PROGRESS NOTES
Follow Up      Chief Complaint: Follow up    Last Seen: 3/19/25 by me    History of Present Illness:   Kali Ocasio is a 45 y.o. male with PMH of situational anxiety, depression, GERD (previously scoped 12 years ago, now controlled on meds), diet-controlled T2DM, and hyperlipidemia who presents for a follow up     The patient returned for a follow-up visit primarily to review recent lab results and discuss ongoing health management. The laboratory findings showed that the patient's cholesterol levels have improved, with LDL slightly above normal, but not concerning. This indicates the statin and lifestyle changes are effective. The patient's previous anemia noted in earlier labs is now resolved. The vitamin D level was slightly low, classified as insufficient, but supplementation was not deemed necessary. The patient reports taking 1,000 IU of vitamin D daily and avoiding sun exposure due to easy sunburn.    The patient has been receiving testosterone therapy through a men's clinic since mid-April, following a diagnosis of low testosterone. The patient is aware of the associated risks, including potential cardiac events, and was advised to seek immediate medical attention if symptoms such as chest pain or unexplained sweating occur. The patient's diabetes is well-controlled with a recent A1c of 5.7, and they report weight stability with some muscle gain. There have been improvements in mood and dietary habits, with reduced sugar intake and balanced meals.    The patient experiences occasional heartburn, managed with omeprazole and famotidine. Heartburn episodes have reduced to about twice in the last three months, a significant improvement from previous daily occurrences. It was discussed that it's been over twelve years since the patient's last esophagogastroduodenoscopy (EGD), prompting a referral to gastroenterology for potential reevaluation due to long-term severe reflux and associated risk  factors.    The patient was reminded about the importance of regular retinal screenings due to their diabetic status, and a referral to ophthalmology was provided. The patient also mentioned briefly history of restless leg sensations, which occur rarely and do not significantly impact daily life.    Review of Systems:  Review of Systems   Constitutional:  Negative for chills, fever, malaise/fatigue and weight loss.   HENT: Negative.     Eyes: Negative.    Respiratory:  Negative for cough, hemoptysis, sputum production and shortness of breath.    Cardiovascular:  Negative for chest pain, palpitations, orthopnea, claudication and leg swelling.   Gastrointestinal:  Negative for abdominal pain, constipation, diarrhea, nausea and vomiting.   Genitourinary:  Negative for dysuria, frequency and urgency.   Musculoskeletal:  Negative for back pain, joint pain, myalgias and neck pain.   Skin:  Negative for itching and rash.   Neurological:  Negative for dizziness, tingling, tremors, sensory change, speech change and headaches.   Endo/Heme/Allergies:  Negative for environmental allergies and polydipsia. Does not bruise/bleed easily.   Psychiatric/Behavioral:  Negative for depression, hallucinations, substance abuse and suicidal ideas. The patient is not nervous/anxious.    All other systems reviewed and are negative.       Past Medical History:   Past Medical History[1]    Patient Active Problem List    Diagnosis Date Noted    Type 2 diabetes mellitus without complication, without long-term current use of insulin ([facility]) 12/23/2024    Dyslipidemia 12/23/2024    BMI 29.0-29.9,adult 11/11/2024    Gastroesophageal reflux disease without esophagitis 11/11/2024    Other fatigue 11/11/2024    Generalized anxiety disorder 11/11/2024       Past Surgical History:   Past Surgical History[2]     Allergies:  Iodine and Shellfish-derived products    Medications:     Current Outpatient Medications:     famotidine, 20 mg, Oral, HS PRN, Taking  "As Needed    testosterone cypionate, inject 1 ml intramuscularly once weekly, Taking    omeprazole, 40 mg, Oral, DAILY, Taking    atorvastatin, 40 mg, Oral, Nightly, Taking     Social History:   Social History[3]    Family History:   Family History   Problem Relation Age of Onset    Hypertension Mother        Objective:  Vitals:   /84 (BP Location: Left arm, Patient Position: Sitting, BP Cuff Size: Adult)   Pulse 93   Temp 36.6 °C (97.9 °F) (Temporal)   Ht 1.702 m (5' 7.01\")   Wt 85.5 kg (188 lb 9.6 oz)   SpO2 95%  Body mass index is 29.53 kg/m².    Physical Exam:   Physical Exam  Vitals reviewed.   Constitutional:       Appearance: Normal appearance.   HENT:      Head: Normocephalic and atraumatic.      Nose: Nose normal.      Mouth/Throat:      Mouth: Mucous membranes are moist.      Pharynx: Oropharynx is clear.   Eyes:      Extraocular Movements: Extraocular movements intact.      Conjunctiva/sclera: Conjunctivae normal.      Pupils: Pupils are equal, round, and reactive to light.   Cardiovascular:      Rate and Rhythm: Normal rate and regular rhythm.      Pulses: Normal pulses.      Heart sounds: Normal heart sounds. No murmur heard.  Pulmonary:      Effort: Pulmonary effort is normal.      Breath sounds: Normal breath sounds. No wheezing, rhonchi or rales.   Abdominal:      General: Abdomen is flat. Bowel sounds are normal. There is no distension.      Palpations: Abdomen is soft. There is no mass.      Tenderness: There is no abdominal tenderness.   Musculoskeletal:         General: Normal range of motion.      Right lower leg: No edema.      Left lower leg: No edema.   Skin:     General: Skin is warm and dry.      Capillary Refill: Capillary refill takes less than 2 seconds.      Findings: No lesion or rash.   Neurological:      General: No focal deficit present.      Mental Status: He is alert and oriented to person, place, and time. Mental status is at baseline.      Cranial Nerves: No cranial " nerve deficit.      Motor: No weakness.          Results:  Labs and imaging relevant to this visit were reviewed.     Assessment and Plan:    45 y.o. male with:     1. Gastroesophageal reflux disease without esophagitis (Primary)  Patient with hx of GERD, previously had daily symptoms and occasionally nightly awakenings. No alarm symptoms, no dysphagia, weight loss, or early satiety.  Previous EGD was approximately 12 years ago but did not note any esophagitis at that time but has not followed up with GI since that point  - Continue omeprazole daily  - Continue famotidine nightly as needed  - Instructed patient to reach out to gastroenterology for a appointment for possible surveillance monitoring with EGD    2. Hyperlipidemia, unspecified hyperlipidemia type  Patient's LDL total cholesterol and triglycerides are all markedly improved compared to previous.  Patient with very mild muscle cramps initially on statin but have improved at this time.  - Continue Lipitor  - Encouraged patient on his ongoing diet and exercise regimens and their efficacy    3. Diet-controlled diabetes mellitus (HCC)  Patient with A1c of 5.7 on most recent labs down from 7.2.  However previous CMP at the time of that 7.2 showed a fasting glucose of approximately 100 which does not correlate particularly well with that elevated A1c level, so some concern that that was initially a lab error.  - Will repeat A1c in approximately 3 months then if at that time it is back to a prediabetic level will remove the diagnosis of diabetes mellitus and will document history of diabetes in the future  - Encouraged patient on his ongoing diet and exercise management  - HEMOGLOBIN A1C; Future    6. Long-term current use of testosterone cypionate  Patient currently on testosterone replacement therapy despite not having any diagnosis of hypogonadism and having normal testosterone levels in the past explained to him that there is significant risks associated with  this particularly in regards to cardiac events, strokes, embolic events  - Patient is aware of the risks involved and we had extensive discussion in regards to what this can cause  - Provided patient with strict ER precautions  - Will continue to broach the topic of this at future visits and attempt to wean patient off of this    7. Situational anxiety  Patient with a history of situational anxiety related to familial as well as life stressors including his move and his work states that these have markedly improved at this time with lifestyle modifications and feels quite stable in terms of his mental health  - Continue to monitor mental health    8. Screening for endocrine, nutritional, metabolic and immunity disorder  - Comp Metabolic Panel; Future    9.  Shellfish allergy  10.  History of anaphylactic shock  Patient has a history of anaphylactic shock in the setting of his shellfish allergy he notes that at the age of 20 he ate shellfish and noted his throat closing up he stated that this resolved after about 30 seconds to a minute but never had any follow-up on that.  It was somewhat unclear whether the iodine in the shrimp was causing this but he has avoided it since then successfully and has not had any recurrences.  - Provided patient with prescription for EpiPen in case of future anaphylactic events provided him instructions on the situations in which to use this and the proper usage      No problem-specific Assessment & Plan notes found for this encounter.       Orders Placed This Encounter    HEMOGLOBIN A1C    Comp Metabolic Panel    famotidine (PEPCID) 20 MG Tab    testosterone cypionate (DEPO-TESTOSTERONE) 200 MG/ML injection       No follow-ups on file.    Ok Silva MD  Internal Medicine PGY-1  [facility]        [1]   Past Medical History:  Diagnosis Date    Anxiety     Depression    [2] No past surgical history on file.  [3]   Social History  Tobacco Use    Smoking  status: Former     Types: Cigarettes     Start date: 2008     Quit date: 2001     Years since quittin.4    Smokeless tobacco: Never   Vaping Use    Vaping status: Some Days    Substances: Nicotine   Substance Use Topics    Alcohol use: Yes     Comment: occasionally    Drug use: Not Currently     Types: Methamphetamines     Comment: previous meth use 2003

## 2025-06-11 NOTE — PROGRESS NOTES
Teaching Physician Attestation      Level of Participation    I discussed with the resident physician the patient's history, exam, assessment and plan in detail.  Topics listed in my addendum were the focus of the visit.  Healthcare maintenance was not addressed this visit unless listed as a topic in my addendum.  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  Will continue atorvastatin 40 for now, even though diabetes is reversed, given how high LDL was.  No myalgias offered.  Htg resolved.  Cont diet.    Possible history of type 2 diabetes, now improved to prediabetes with diet and exercise  - Patient notes significant dietary changes and has increased exercise, as result A1c is now 5.7.  There is a question of whether his original A1c was accurate given a corresponding fasting blood glucose was 105,, which would not match with an A1c of 7.2.  harpreet/cr nl.  Regardless of whether this was a laboratory error or reversal due to diet and exercise, is no longer actively diabetic.  Praised patient for his excellent diet and exercise modifications.  Will monitor again in 3 months with both fasting blood sugar and A1c.  No wounds on feet, although is no longer diabetic does not need the same monitoring as previously    Testosterone counseling  -was never low testosterone on check, no hypogonadism on exam.  Patient was started on testosterone supplementation by an outside clinic, his testosterone levels are now supratherapeutic.  Counseled that this is a very high risk situation in terms of potential side effects from testosterone, and he does not have a clear clinical indication to be  on this medication.    Gerd  -well controlled on omeprazole, occasional pepcid prn.  Nonexertional typical GERD symptoms with typical triggers.  Improved compared to prior.  No food getting stuck or black or bloody stools, although prior dysphagia.  -Given prior severity of symptoms and duration of symptoms, had GI referral placed, will appreciate input about possibility for endoscopy for monitoring    Shellfish allergy w/ anaphylaxis  -epipen issued w/ instructions.    Return to clinic 3 months.  PCR

## 2025-06-11 NOTE — PATIENT INSTRUCTIONS
[facility]      [address] [facility] [id number]  [phone]     [facility] [place]      [address] [place] [id number] [phone]